# Patient Record
Sex: MALE | Race: BLACK OR AFRICAN AMERICAN | NOT HISPANIC OR LATINO | URBAN - METROPOLITAN AREA
[De-identification: names, ages, dates, MRNs, and addresses within clinical notes are randomized per-mention and may not be internally consistent; named-entity substitution may affect disease eponyms.]

---

## 2020-11-16 ENCOUNTER — EMERGENCY (EMERGENCY)
Facility: HOSPITAL | Age: 30
LOS: 1 days | Discharge: HOME | End: 2020-11-16
Attending: EMERGENCY MEDICINE
Payer: MEDICAID

## 2020-11-16 VITALS
HEART RATE: 58 BPM | HEIGHT: 73 IN | RESPIRATION RATE: 16 BRPM | TEMPERATURE: 99 F | DIASTOLIC BLOOD PRESSURE: 75 MMHG | SYSTOLIC BLOOD PRESSURE: 129 MMHG | WEIGHT: 225.09 LBS

## 2020-11-16 PROCEDURE — 99283 EMERGENCY DEPT VISIT LOW MDM: CPT | Mod: 25

## 2020-11-16 PROCEDURE — 64400 NJX AA&/STRD TRIGEMINAL NRV: CPT | Mod: LT

## 2020-11-16 PROCEDURE — 64400 NJX AA&/STRD TRIGEMINAL NRV: CPT | Mod: 26

## 2020-11-16 RX ORDER — IBUPROFEN 200 MG
600 TABLET ORAL ONCE
Refills: 0 | Status: COMPLETED | OUTPATIENT
Start: 2020-11-16 | End: 2020-11-16

## 2020-11-16 RX ADMIN — Medication 600 MILLIGRAM(S): at 10:03

## 2020-11-16 NOTE — ED PROVIDER NOTE - NSFOLLOWUPCLINICS_GEN_ALL_ED_FT
[facility]  Dental  [address] [place] [id number]  Phone: [phone]  Fax:   Follow Up Time: Urgent    Oral & Maxillofacial Surgery  Department of Dental Medicine  [address] [place] [address]  Phone: [phone]  Fax: [phone]  Follow Up Time: Urgent

## 2020-11-16 NOTE — ED PROVIDER NOTE - NS ED ROS FT
(+) dental pain  (-) fever, facial swelling, trismus, change in phonation, chest pain, sob, cough, rash, meningismus, AMS

## 2020-11-16 NOTE — ED PROVIDER NOTE - CLINICAL SUMMARY MEDICAL DECISION MAKING FREE TEXT BOX
--- ------------ATTENDING NOTE------------   pt c/o months of intermittent dental pain from cavities / fractured teeth, specifically in mandibular molars, no abscess or systemic infectious changes, food debri removed w/ significant improvement in pain, tolerating PO, nml VS at dc, in depth dw pt about ddx, tx, ndiaye, continued close outpt fu, going to Dental Clinic immediately after ED dc.  - Bipin Hidalgo MD   ---------------------------------------------------------

## 2020-11-16 NOTE — ED ADULT NURSE NOTE - OBJECTIVE STATEMENT
31 y/o male presents to ED reporting toothache. Pt reports taking ibuprofen yesterday night for the pain without relief. On exam, AOx3, speaking in complete sentences. Unlabored, spontaneous respirations, NAD. Abdomen soft, non-tender, non-distended. Pt denies CP, SOB, n/v/d, fever/chills at this time. MD at bedside to evaluate pt.

## 2020-11-16 NOTE — ED PROVIDER NOTE - PHYSICAL EXAMINATION
Well Appearing, Nontoxic, NAD;  Symm Facies, PERRL 3mm, MMM, multiple dental caries w/o abscess, tender dental fx tooth #16;  No stridor;  RRR w/o m/g/r;   CTAB w/o w/r/r;   No rash;  AOX3, Normal speech, normal strength/sensation/gait

## 2020-11-16 NOTE — ED PROCEDURE NOTE - PROCEDURE ADDITIONAL DETAILS
Tooth #16 fx/cavity, no abscess, +tender   - Left Inferior Alveolar Nerve Block (lidocaine/epi : bupivacaine 1:1 5 ml, sterile, by landmarks)   - high pressure saline irrigation of dental fx w/ removal of food debris   - tolerated well, no complication    Bipin Hidalgo MD

## 2020-11-16 NOTE — ED PROVIDER NOTE - NSFOLLOWUPINSTRUCTIONS_ED_ALL_ED_FT
See DENTAL CLINIC in next week for follow up -- call to discuss.    Use Acetaminophen and/or Ibuprofen as directed for pain -- see medication warnings.    Eat Soft Mechanical Diet, See DENTAL PAIN information and return instructions given to you.    Seek immediate medical care for new/worsening symptoms/concerns.

## 2020-11-16 NOTE — ED PROVIDER NOTE - PATIENT PORTAL LINK FT
You can access the FollowMyHealth Patient Portal offered by [facility] by registering at the following website: [email or web address]. By joining [employer]’s FollowMyHealth portal, you will also be able to view your health information using other applications (apps) compatible with our system.

## 2020-11-16 NOTE — ED PROCEDURE NOTE - NS_EDPROVIDERDISPOUSERTYPE_ED_A_ED
CERTIFICATE OF WORK    March 6, 2017      Re:   Brent Swenson  [address] [facility] [id number]                        This is to certify that Brent Swenson has been under my care from 3/6/2017 and can return to regular work on 03-    RESTRICTIONS: none      REMARKS: Please excuse Brent from work 03- and 03-, may return 03-        SIGNATURE:___________________________________________         Vikas Ortega MD          [address] [address]  T [phone]  F [phone]          Attending Attestation (For Attendings USE Only)...